# Patient Record
Sex: MALE | Race: AMERICAN INDIAN OR ALASKA NATIVE
[De-identification: names, ages, dates, MRNs, and addresses within clinical notes are randomized per-mention and may not be internally consistent; named-entity substitution may affect disease eponyms.]

---

## 2021-04-24 ENCOUNTER — HOSPITAL ENCOUNTER (EMERGENCY)
Dept: HOSPITAL 5 - ED | Age: 21
Discharge: HOME | End: 2021-04-24
Payer: COMMERCIAL

## 2021-04-24 VITALS — SYSTOLIC BLOOD PRESSURE: 128 MMHG | DIASTOLIC BLOOD PRESSURE: 70 MMHG

## 2021-04-24 DIAGNOSIS — S16.1XXA: Primary | ICD-10-CM

## 2021-04-24 DIAGNOSIS — Z79.899: ICD-10-CM
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
## 2021-04-24 DIAGNOSIS — S39.012A: ICD-10-CM
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
## 2021-04-24 DIAGNOSIS — S09.90XA: ICD-10-CM

## 2021-04-24 DIAGNOSIS — Z98.890: ICD-10-CM
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
## 2021-04-24 PROCEDURE — 72125 CT NECK SPINE W/O DYE: CPT

## 2021-04-24 PROCEDURE — 70450 CT HEAD/BRAIN W/O DYE: CPT

## 2021-04-24 PROCEDURE — 72070 X-RAY EXAM THORAC SPINE 2VWS: CPT
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
## 2021-04-24 NOTE — XRAY REPORT
XR shoulder 2+V RT, XR spine thoracic 2V



INDICATION / CLINICAL INFORMATION:

MAIN.



COMPARISON: 

None available.



FINDINGS:



Shoulder: No acute fracture.  Normal alignment.  Joint spaces are preserved. No destructive osseous l
esion or suspicious periosteal reaction.



Thoracic: Mild leftward curvature of the thoracic spine. Vertebral body heights are preserved.





Impression:

1.No acute abnormality seen in the shoulder or thoracic spine.



Signer Name: Jean Storey MD 

Signed: 4/24/2021 6:00 PM

Workstation Name: [id number]

## 2021-04-24 NOTE — XRAY REPORT
XR shoulder 2+V RT, XR spine thoracic 2V



INDICATION / CLINICAL INFORMATION:

MAIN.



COMPARISON: 

None available.



FINDINGS:



Shoulder: No acute fracture.  Normal alignment.  Joint spaces are preserved. No destructive osseous l
esion or suspicious periosteal reaction.



Thoracic: Mild leftward curvature of the thoracic spine. Vertebral body heights are preserved.





Impression:

1.No acute abnormality seen in the shoulder or thoracic spine.



Signer Name: Jean Storey MD 

Signed: 4/24/2021 6:00 PM

Workstation Name: [id number]

## 2021-07-06 ENCOUNTER — HOSPITAL ENCOUNTER (EMERGENCY)
Dept: HOSPITAL 5 - ED | Age: 21
Discharge: HOME | End: 2021-07-06
Payer: SELF-PAY

## 2021-07-06 VITALS — SYSTOLIC BLOOD PRESSURE: 111 MMHG | DIASTOLIC BLOOD PRESSURE: 69 MMHG

## 2021-07-06 DIAGNOSIS — L02.31: Primary | ICD-10-CM

## 2021-07-06 DIAGNOSIS — Z98.890: ICD-10-CM

## 2021-07-06 DIAGNOSIS — J45.909: ICD-10-CM

## 2021-07-06 DIAGNOSIS — Z79.899: ICD-10-CM

## 2021-07-06 PROCEDURE — 99282 EMERGENCY DEPT VISIT SF MDM: CPT

## 2021-07-06 NOTE — EMERGENCY DEPARTMENT REPORT
- General


Chief complaint: Skin/Abscess/Foreign Body


Stated complaint: CYST ON BUTTOCKS


Time Seen by Provider: 07/06/21 18:37


Source: patient


Mode of arrival: Ambulatory


Limitations: No Limitations





- History of Present Illness


Initial comments: 





Patient is a 21-year-old male presents emergency room with complaints of a boil 

to the gluteus region that began approximately 5 days ago.  He states he 

typically gets these in the axilla and they opened and drained on their own.  He

states he has never had this present to the gluteus region before.  He states 

that it has gotten bigger and has become more painful.  He denies any drainage, 

fever, nausea, vomiting, diarrhea, chills.  No past medical history.  No 

allergies to medications.





- Related Data


                                  Previous Rx's











 Medication  Instructions  Recorded  Last Taken  Type


 


Naproxen [Naprosyn] 500 mg PO BID PRN #20 tablet 04/24/21 Unknown Rx


 


methocarbamoL [Methocarbamol] 1,000 mg PO TID PRN #20 tablet 04/24/21 Unknown Rx


 


Ibuprofen [Motrin 600 MG tab] 600 mg PO Q8H PRN #14 tablet 07/06/21 Unknown Rx


 


Sulfamethoxazole/Trimethoprim 1 each PO BID 7 Days #14 tablet 07/06/21 Unknown 

Rx





[Bactrim DS TAB]    











                                    Allergies











Allergy/AdvReac Type Severity Reaction Status Date / Time


 


No Known Allergies Allergy   Unverified 04/24/21 16:23














Abscess Boil HPI





- HPI


Chief Complaint: Skin/Abscess/Foreign Body


Stated Complaint: CYST ON BUTTOCKS


Time Seen by Provider: 07/06/21 18:37


Home Medications: 


                                  Previous Rx's











 Medication  Instructions  Recorded  Last Taken  Type


 


Naproxen [Naprosyn] 500 mg PO BID PRN #20 tablet 04/24/21 Unknown Rx


 


methocarbamoL [Methocarbamol] 1,000 mg PO TID PRN #20 tablet 04/24/21 Unknown Rx


 


Ibuprofen [Motrin 600 MG tab] 600 mg PO Q8H PRN #14 tablet 07/06/21 Unknown Rx


 


Sulfamethoxazole/Trimethoprim 1 each PO BID 7 Days #14 tablet 07/06/21 Unknown 

Rx





[Bactrim DS TAB]    











Allergies/Adverse Reactions: 


                                    Allergies











Allergy/AdvReac Type Severity Reaction Status Date / Time


 


No Known Allergies Allergy   Unverified 04/24/21 16:23














ED Review of Systems


ROS: 


Stated complaint: CYST ON BUTTOCKS


Other details as noted in HPI





Comment: All other systems reviewed and negative





ED Past Medical Hx





- Past Medical History


Previous Medical History?: Yes


Hx Asthma: Yes





- Surgical History


Past Surgical History?: Yes


Additional Surgical History: Umbilical





- Social History


Smoking Status: Never Smoker


Substance Use Type: None





- Medications


Home Medications: 


                                Home Medications











 Medication  Instructions  Recorded  Confirmed  Last Taken  Type


 


Naproxen [Naprosyn] 500 mg PO BID PRN #20 tablet 04/24/21  Unknown Rx


 


methocarbamoL [Methocarbamol] 1,000 mg PO TID PRN #20 tablet 04/24/21  Unknown 

Rx


 


Ibuprofen [Motrin 600 MG tab] 600 mg PO Q8H PRN #14 tablet 07/06/21  Unknown Rx


 


Sulfamethoxazole/Trimethoprim 1 each PO BID 7 Days #14 tablet 07/06/21  Unknown 

Rx





[Bactrim DS TAB]     














ED Physical Exam





- General


Limitations: No Limitations


General appearance: alert, in no apparent distress





- Head


Head exam: Present: atraumatic, normocephalic





- Eye


Eye exam: Present: normal appearance





- ENT


ENT exam: Present: mucous membranes moist





- Neurological Exam


Neurological exam: Present: alert, oriented X3





- Psychiatric


Psychiatric exam: Present: normal affect, normal mood





- Skin


Skin exam: Present: warm, dry, other (chaperone: SHAINA joyce, there is a 4 cm area

of induration present to the left gluteus, there is central fluctuance, no 

opening, no drainage, no significant surrounding cellulitis)





ED Course


                                   Vital Signs











  07/06/21





  16:27


 


Temperature 99.0 F


 


Pulse Rate 105 H


 


Respiratory 20





Rate 


 


Blood Pressure 111/69


 


O2 Sat by Pulse 98





Oximetry 














- I & D


  ** Buttocks


Type of Procedure: Complex


Site: left gluteal


Blade Size: 11


I & D Procedure: betadine prep, sterile drapes applied, sterile dressing applied


Progress: 





Chaperoned by SHAINA Joyce








Verbal consent obtained by patient risk and alternatives discussed








Betadine prep, sterile drapes applied, 5 cc of 1% lidocaine without epinephrine 

used anesthetic, 2 cm incision made with 11 blade, copious amounts of purulent 

foul odor drainage expressed, irrigated with saline, packed with iodoform, 

sterile dressing applied, patient tolerated well, no complications, bleeding 

controlled





ED Medical Decision Making





- Medical Decision Making





Patient is a 21-year-old male presents emergency room with complaints of a boil 

to the gluteus region that began approximately 5 days ago.  He states he 

typically gets these in the axilla and they opened and drained on their own.  He

states he has never had this present to the gluteus region before.  He states 

that it has gotten bigger and has become more painful.  He denies any drainage, 

fever, nausea, vomiting, diarrhea, chills.  No past medical history.  No 

allergies to medications. vss. on exam: chaperone: SHAINA joyce, there is a 4 cm 

area of induration present to the left gluteus, there is central fluctuance, no 

opening, no drainage, no significant surrounding cellulitis. I&D performed per 

procedure note. pt given prescription for bactrim. advised pt  Please take 

medication as prescribed.  You need to return in 2 to 3 days to have packing 

removed.  Please keep area clean, dry, covered.  May wash around area with soap 

and water and pat dry.  No hot tub, no pool, no soaking in water.  Follow-up 

with your primary care doctor.  Return to emergency room for any new or 

worsening symptoms.


Critical care attestation.: 


If time is entered above; I have spent that time in minutes in the direct care 

of this critically ill patient, excluding procedure time.








ED Disposition


Clinical Impression: 


 Abscess, gluteal





Disposition: DC-01 TO HOME OR SELFCARE


Is pt being admited?: No


Does the pt Need Aspirin: No


Condition: Stable


Instructions:  Incision and Drainage


Additional Instructions: 


Please take medication as prescribed.  You need to return in 2 to 3 days to have

packing removed.  Please keep area clean, dry, covered.  May wash around area 

with soap and water and pat dry.  No hot tub, no pool, no soaking in water.  

Follow-up with your primary care doctor.  Return to emergency room for any new 

or worsening symptoms.


Prescriptions: 


Sulfamethoxazole/Trimethoprim [Bactrim DS TAB] 1 each PO BID 7 Days #14 tablet


Ibuprofen [Motrin 600 MG tab] 600 mg PO Q8H PRN #14 tablet


 PRN Reason: Pain


Referrals: 


BASILIA WILSON MD [Staff Physician] - 3-5 Days


[facility] [Provider Group] - 3-5 Days


Forms:  Work/School Release Form(ED)


Time of Disposition: 19:02


Print Language: ENGLISH

## 2021-08-17 ENCOUNTER — HOSPITAL ENCOUNTER (OUTPATIENT)
Dept: HOSPITAL 5 - XRAY | Age: 21
Discharge: HOME | End: 2021-08-17
Attending: INTERNAL MEDICINE
Payer: COMMERCIAL

## 2021-08-17 DIAGNOSIS — Z02.71: Primary | ICD-10-CM

## 2021-08-17 PROCEDURE — 72100 X-RAY EXAM L-S SPINE 2/3 VWS: CPT

## 2021-08-17 NOTE — XRAY REPORT
Left shoulder 3 views



INDICATION: Shoulder pain



FINDINGS: AC joint and glenohumeral joint appear normal. No acute fracture or dislocation. No soft ti
ssue abnormality is seen.



Signer Name: Alfredo Woodall MD 

Signed: 8/17/2021 11:15 AM

Workstation Name: [id number]

## 2021-08-17 NOTE — XRAY REPORT
Left shoulder 3 views



INDICATION: Shoulder pain



FINDINGS: AC joint and glenohumeral joint appear normal. No acute fracture or dislocation. No soft ti
ssue abnormality is seen.



Signer Name: Alfredo Woodall MD 

Signed: 8/17/2021 11:15 AM

Workstation Name: [id number]